# Patient Record
Sex: FEMALE | Race: WHITE | NOT HISPANIC OR LATINO | Employment: OTHER | URBAN - METROPOLITAN AREA
[De-identification: names, ages, dates, MRNs, and addresses within clinical notes are randomized per-mention and may not be internally consistent; named-entity substitution may affect disease eponyms.]

---

## 2024-05-10 ENCOUNTER — APPOINTMENT (OUTPATIENT)
Dept: AUDIOLOGY | Facility: CLINIC | Age: 69
End: 2024-05-10
Payer: MEDICARE

## 2024-05-13 ENCOUNTER — CLINICAL SUPPORT (OUTPATIENT)
Dept: AUDIOLOGY | Facility: CLINIC | Age: 69
End: 2024-05-13
Payer: MEDICARE

## 2024-05-13 DIAGNOSIS — H90.3 SENSORINEURAL HEARING LOSS (SNHL) OF BOTH EARS: Primary | ICD-10-CM

## 2024-05-13 PROCEDURE — 92557 COMPREHENSIVE HEARING TEST: CPT | Performed by: AUDIOLOGIST

## 2024-05-13 PROCEDURE — 92550 TYMPANOMETRY & REFLEX THRESH: CPT | Performed by: AUDIOLOGIST

## 2024-05-13 ASSESSMENT — PAIN SCALES - GENERAL: PAINLEVEL_OUTOF10: 0 - NO PAIN

## 2024-05-13 ASSESSMENT — PAIN - FUNCTIONAL ASSESSMENT: PAIN_FUNCTIONAL_ASSESSMENT: 0-10

## 2024-05-13 ASSESSMENT — ENCOUNTER SYMPTOMS: OCCASIONAL FEELINGS OF UNSTEADINESS: 0

## 2024-05-13 NOTE — PROGRESS NOTES
AUDIOLOGY ADULT AUDIOMETRIC EVALUATION    Name:  Brigid Nesbitt  :  1955  Age:  69 y.o.  Date of Evaluation:  May 13, 2024    Reason for visit: Ms. Nesbitt is seen in the clinic today for an audiologic evaluation. Patient complains of hearing loss.    HISTORY  Patient reports worsening hearing and periodic lightheadedness/vertigo. Patient denies tinnitus and aural fullness/pressure. Case history was obtained from the patient. Most recent audiogram dated 2020.    SCREENINGS  Steadi Fall Risk  One or more falls in the last year? No  Feels unsteady when walking? No  Worried about Falling? No    Domestic Violence Screening  Are you currently or have you recently been threatened or abused physically, emotionally, or sexually by anyone? No  Do you feel UNSAFE going back to the place you are living? No    Pain Assessment  Pain Assessment: 0-10  Pain Score: 0 - No pain    EVALUATION  See scanned audiogram: “Media” > “Audiology Report” > Document ID [id number].    RESULTS  Otoscopic Evaluation  Right Ear: minimal non-occluding cerumen with visualization of the tympanic membrane  Left Ear: minimal non-occluding cerumen with visualization of the tympanic membrane    Immittance Measures  Tympanometry:  Right Ear: Type A, normal tympanic membrane mobility with normal middle ear pressure   Left Ear: Type A, normal tympanic membrane mobility with normal middle ear pressure     Acoustic Reflexes:  Ipsilateral Right Ear: present and normal from 500 Hz to 4000 Hz  Ipsilateral Left Ear: absent from 500 Hz to 4000 Hz  Contralateral Right Ear: did not evaluate  Contralateral Left Ear: did not evaluate    Distortion Product Otoacoustic Emissions (DPOAEs)  Right Ear: absent from 1000 Hz to 8000 Hz  Left Ear: absent from 1000 Hz to 8000 Hz    Audiometry  Test Technique and Reliability:   Standard audiometry via supra-aural headphones. Pulsed tone stimulus. Reliability is good.    Pure tone air and bone conduction  audiometry:  Right Ear: mild to moderately-severe sensorineural hearing loss   Left Ear: moderate to severe sensorineural hearing loss     Speech Audiometry:  Speech Reception Threshold (SRT) Right Ear: 45 dB HL  Speech Reception Threshold (SRT) Left Ear: 60 dB HL  Word Recognition Score (WRS) Right Ear: Excellent, 92% at 85 dB HL  Word Recognition Score (WRS) Left Ear: Excellent, 92% at 95 dB HL     IMPRESSIONS  Audiometric evaluation revealed sensorineural hearing loss bilaterally. Tympanometry indicates normal tympanic membrane mobility with normal middle ear pressure bilaterally. Results show progression compared with 09/08/2020 evaluation. The presence of acoustic reflexes within normal intensity limits is consistent with normal middle ear and brainstem function, and suggests that auditory sensitivity is not significantly impaired. The absence of acoustic reflexes is consistent with a middle ear disorder, hearing loss in the stimulated ear, and/or interruption of neural innervation of the stapedius muscle. Absent Distortion Product Otoacoustic Emissions (DPOAEs) are consistent with abnormal cochlear outer hair cell function and some degree of hearing loss at those frequencies.    RECOMMENDATIONS  - Referral to otolaryngology regarding worsening asymmetry.  - Annual audiologic evaluation, sooner if an acute change is noted.  - Hearing aid check and adjustment.  - Continued hearing aid use.  - Consider new hearing aids. Contact insurance to determine if there is an applicable benefit and where it can be used.  - Follow-up with audiology annually for routine hearing aid maintenance, sooner if questions/problems arise.  - Follow-up with medical care team as planned.    PATIENT EDUCATION  Discussed results, impressions and recommendations with the patient. Questions were addressed and the patient was encouraged to contact our office should concerns arise.    Time for this encounter: [id number]    Judith Payne  [place], [facility]  Licensed Audiologist

## 2024-05-13 NOTE — PROGRESS NOTES
AUDIOLOGY ADULT HEARING AID APPOINTMENT    Name:  Brigid Nesbitt  :  1955  Age:  69 y.o.  Date of Service:  May 13, 2024    Reason for visit: Ms. Nesbitt is seen in the clinic today for a hearing aid check appointment. Patient complains that her hearing aids are not working as well as they used to. The left hearing aid has always been uncomfortable also.    HISTORY  Hearing Aid History:  Patient wears binaural 2016 Phonak Audeo [id number]  in the canal [place] hearing aids in color P5 with 1xS  on the right, 0xS  on the left, medium closed dome on the right, small closed dome on the left, and retention lines (right [id number], left [id number]). There are no applicable warranties or in-office service plans. [facility] [place].    Hearing Loss History:  Audiogram completed 2024 revealed mild to moderately-severe sensorineural hearing loss in the right ear, and moderate to severe sensorineural hearing loss in the left ear. Word recognition ability was excellent bilaterally.     RESULTS/HEARING AIDS  Otoscopic Evaluation:  Right Ear: minimal non-occluding cerumen with visualization of the tympanic membrane  Left Ear: minimal non-occluding cerumen with visualization of the tympanic membrane    Hearing Aid Initial Listening Check:  Right: weak  Left: weak    Hearing Aid Physical Examination:  Right: partially occluded wax guard, dirty dome, dirty microphones, brittle retention line  Left: partially occluded wax guard, dirty dome, dirty microphones, brittle retention line    Hearing Aid Clean & Check:  Hearing aids were sanitized and checked. Receivers, domes, and retention lines were replaced. Microphones were brushed and vacuumed. Swapped small closed dome for cap dome on the left.    Hearing Aid Final Listening Check:  Right: improvement noted  Left: improvement noted    Hearing Aid Adjustment & Connectivity & Specific Counseling:  Devices were connected to  the software via [employer]. Applied current audiogram. Ran feedback manager.    IMPRESSIONS  Hearing aids were returned to the patient and she expressed satisfaction. Patient purchased two packs of cerustop.    Patient wears binaural August 2016 Phonak eBuildereo [id number]  in the canal ([place] hearing aids in color P5 with 1xS  on the right, 0xS  on the left, medium closed dome on the right, cap dome on the left, and retention lines (right [id number], left [id number]). There are no applicable warranties or in-office service plans. Fit through [employer].    RECOMMENDATIONS  - Continued hearing aid use.  - Consider new hearing aids. Contact insurance to determine if there is an applicable benefit and where it can be used.  - Follow-up with audiology annually for routine hearing aid maintenance, sooner if questions/problems arise.  - Referral to otolaryngology regarding worsening asymmetry.  - Annual audiologic evaluation, sooner if an acute change is noted.  - Follow-up with medical care team as planned.    PATIENT EDUCATION  Discussed results, impressions and recommendations with the patient. Questions were addressed and the patient was encouraged to contact our office should concerns arise.    CHARGE CAPTURE  $60; paper encounter form utilized and submitted to  for payment processing.    Time for this encounter: [phone]    Iman Powell, CCC-A  Licensed Audiologist

## 2024-08-26 ENCOUNTER — APPOINTMENT (OUTPATIENT)
Dept: OTOLARYNGOLOGY | Facility: CLINIC | Age: 69
End: 2024-08-26
Payer: MEDICARE

## 2024-08-26 VITALS — WEIGHT: 120 LBS | BODY MASS INDEX: 19.99 KG/M2 | HEIGHT: 65 IN

## 2024-08-26 DIAGNOSIS — H90.3 ASNHL (ASYMMETRICAL SENSORINEURAL HEARING LOSS): ICD-10-CM

## 2024-08-26 DIAGNOSIS — H90.3 BILATERAL SENSORINEURAL HEARING LOSS: Primary | ICD-10-CM

## 2024-08-26 PROCEDURE — 3008F BODY MASS INDEX DOCD: CPT | Performed by: OTOLARYNGOLOGY

## 2024-08-26 PROCEDURE — 99203 OFFICE O/P NEW LOW 30 MIN: CPT | Performed by: OTOLARYNGOLOGY

## 2024-08-26 PROCEDURE — 1159F MED LIST DOCD IN RCRD: CPT | Performed by: OTOLARYNGOLOGY

## 2024-08-26 PROCEDURE — 1036F TOBACCO NON-USER: CPT | Performed by: OTOLARYNGOLOGY

## 2024-08-26 RX ORDER — PRAVASTATIN SODIUM 20 MG/1
TABLET ORAL
COMMUNITY

## 2024-08-26 NOTE — PROGRESS NOTES
Chief Complaint   Patient presents with    New Patient Visit     LOV 2018- AUDIO F/U      HPI:  Brigid Nesbitt is a 69 y.o. female who has a longstanding history of bilateral sensorineural hearing loss and wears hearing aids presents today for evaluation.  She had an audiogram in May 13, 2024 which continues to show bilateral sensorineural hearing loss moderate to severe in nature.  The left side has always been slightly worse than the right in the low tones and just a little bit more so on this most recent audiogram compared with others dated back to 2015.  She is seen ENT in the past for evaluation for possible left-sided Ménière's disease.  She has had negative imaging as well as injections to the left ear.  She continues to have excellent symmetric discrimination scores and type a tympanograms.  Interested in getting new hearing aids\.  Denies any tinnitus, pressure, pain, vertigo    PMH:  Past Medical History:   Diagnosis Date    Age-related nuclear cataract, left eye 09/05/2014    Age-related nuclear cataract of left eye    Cortical age-related cataract, left eye 08/01/2014    Cortical age-related cataract of left eye    Encounter for screening for malignant neoplasm of vagina     Vaginal Pap smear    HL (hearing loss)     Nonexudative age-related macular degeneration, bilateral, stage unspecified 07/03/2014    Age-related macular degeneration, dry, both eyes    Other conditions influencing health status 02/09/2016    History of dyspareunia    Personal history of other diseases of the nervous system and sense organs     History of cataract    Personal history of other medical treatment     H/O mammogram    Unspecified cataract 07/03/2014    Cataract, cortical, right eye    Unspecified peripheral retinal degeneration 07/03/2014    Peripheral retinal degeneration    Unspecified retinal disorder     Retinal degeneration     Past Surgical History:   Procedure Laterality Date    COLONOSCOPY  04/15/2014     "Complete Colonoscopy    EYE SURGERY      GALLBLADDER SURGERY  07/03/2014    Gallbladder Surgery         Medications:     Current Outpatient Medications:     pravastatin (Pravachol) 20 mg tablet, TAKE 1 TABLET BY MOUTH EVERY OTHER DAYY, Disp: , Rfl:      Allergies:  Allergies   Allergen Reactions    Doxycycline Hives, Itching and Rash    Sulfa (Sulfonamide Antibiotics) Rash        ROS:  Review of systems normal unless stated otherwise in the HPI and/or PMH.    Physical Exam:  Height 1.651 m (5' 5\"), weight 54.4 kg (120 lb). Body mass index is 19.97 kg/m².     GENERAL APPEARANCE: Well developed and well nourished.  Alert and oriented in no acute distress.  Normal vocal quality.      HEAD/FACE: No erythema or edema or facial tenderness.  Normal facial nerve function bilaterally.    EAR:       EXTERNAL: Normal pinnas and external auditory canals without lesion or obstructing wax.       MIDDLE EAR: Tympanic membranes intact and mobile with normal landmarks.  Middle ear space appears well aerated.       TUBE STATUS: N/A       MASTOID CAVITY: N/A       HEARING: Gross hearing assessment is not within normal limits.      NOSE:       VISUALIZED USING: Anterior rhinoscopy with headlight and nasal speculum.       DORSUM: Midline, nontraumatic appearance.       MUCOSA: Normal-appearing.       SECRETIONS: Normal.       SEPTUM: Midline and nonobstructing.       INFERIOR TURBINATES: Normal.       MIDDLE TURBINATES/MEATUS: N/A       BLEEDING: N/A         ORAL CAVITY/PHARYNX:       TEETH: Adequate dentition.       TONGUE: No mass or lesion.  Normal mobility.       FLOOR OF MOUTH: No mass or lesion.       PALATE: Normal hard palate, soft palate, and uvula.       OROPHARYNX: Normal without mass or lesion.       BUCCAL MUCOSA/GBS: Normal without mass or lesion.       LIPS: Normal.    LARYNX/HYPOPHARYNX/NASOPHARYNX: N/A    NECK: No palpable masses or abnormal adenopathy.  Trachea is midline.    THYROID: No thyromegaly or palpable " nodule.    SALIVARY GLANDS: Normal bilateral parotid and submandibular glands by inspection and palpation.    TMJ's: Normal.    NEURO: Cranial nerve exam grossly normal bilaterally.                         Assessment/Plan   Brigid was seen today for new patient visit.  Diagnoses and all orders for this visit:  Bilateral sensorineural hearing loss (Primary)  ASNHL (asymmetrical sensorineural hearing loss)     Provide education went over old audiograms.  She has a mild asymmetry on the left-hand side without any other symptoms with negative evaluation image imaging in the past.  Doubt any retrocochlear lesion and even if were present there would be no treatment indicated at this time nor does she states she would want any.  For now recommend continued observation with a hearing test again in 1 year or sooner with changes and to go ahead and get new hearing aids.  Follow up in about 1 year (around 8/26/2025) for hearing aid evaluation with the audiologist, audiogram, Recheck.     Te Alvares MD

## 2024-09-20 ENCOUNTER — APPOINTMENT (OUTPATIENT)
Dept: AUDIOLOGY | Facility: CLINIC | Age: 69
End: 2024-09-20
Payer: MEDICARE